# Patient Record
Sex: FEMALE | Race: OTHER
[De-identification: names, ages, dates, MRNs, and addresses within clinical notes are randomized per-mention and may not be internally consistent; named-entity substitution may affect disease eponyms.]

---

## 2018-12-19 ENCOUNTER — HOSPITAL ENCOUNTER (EMERGENCY)
Dept: HOSPITAL 80 - FED | Age: 33
Discharge: HOME | End: 2018-12-19
Payer: COMMERCIAL

## 2018-12-19 VITALS — DIASTOLIC BLOOD PRESSURE: 77 MMHG | SYSTOLIC BLOOD PRESSURE: 128 MMHG
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
## 2018-12-19 DIAGNOSIS — E86.9: ICD-10-CM

## 2018-12-19 DIAGNOSIS — R51: Primary | ICD-10-CM
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
## 2018-12-19 DIAGNOSIS — R10.9: ICD-10-CM

## 2018-12-19 DIAGNOSIS — M54.2: ICD-10-CM

## 2018-12-19 LAB — PLATELET # BLD: 396 10^3/UL (ref 150–400)
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
